# Patient Record
Sex: FEMALE | Race: ASIAN | NOT HISPANIC OR LATINO | URBAN - METROPOLITAN AREA
[De-identification: names, ages, dates, MRNs, and addresses within clinical notes are randomized per-mention and may not be internally consistent; named-entity substitution may affect disease eponyms.]

---

## 2022-08-12 ENCOUNTER — EMERGENCY (EMERGENCY)
Facility: HOSPITAL | Age: 77
LOS: 1 days | Discharge: HOME | End: 2022-08-12
Attending: EMERGENCY MEDICINE
Payer: MEDICAID

## 2022-08-12 VITALS
HEIGHT: 62 IN | HEART RATE: 82 BPM | WEIGHT: 139.99 LBS | DIASTOLIC BLOOD PRESSURE: 79 MMHG | SYSTOLIC BLOOD PRESSURE: 154 MMHG | OXYGEN SATURATION: 97 % | RESPIRATION RATE: 19 BRPM | TEMPERATURE: 98 F

## 2022-08-12 VITALS
DIASTOLIC BLOOD PRESSURE: 62 MMHG | OXYGEN SATURATION: 96 % | HEART RATE: 80 BPM | TEMPERATURE: 97 F | SYSTOLIC BLOOD PRESSURE: 154 MMHG | RESPIRATION RATE: 18 BRPM

## 2022-08-12 PROCEDURE — 72131 CT LUMBAR SPINE W/O DYE: CPT | Mod: 26,ME

## 2022-08-12 PROCEDURE — G1004: CPT

## 2022-08-12 PROCEDURE — 99284 EMERGENCY DEPT VISIT MOD MDM: CPT

## 2022-08-12 PROCEDURE — 72131 CT LUMBAR SPINE W/O DYE: CPT | Mod: ME

## 2022-08-12 PROCEDURE — 99284 EMERGENCY DEPT VISIT MOD MDM: CPT | Mod: 25

## 2022-08-12 RX ORDER — ACETAMINOPHEN 500 MG
975 TABLET ORAL ONCE
Refills: 0 | Status: DISCONTINUED | OUTPATIENT
Start: 2022-08-12 | End: 2022-08-12

## 2022-08-12 RX ORDER — LIDOCAINE 4 G/100G
1 CREAM TOPICAL ONCE
Refills: 0 | Status: COMPLETED | OUTPATIENT
Start: 2022-08-12 | End: 2022-08-12

## 2022-08-12 RX ADMIN — Medication 500 MILLIGRAM(S): at 19:56

## 2022-08-12 RX ADMIN — LIDOCAINE 1 PATCH: 4 CREAM TOPICAL at 19:56

## 2022-08-12 NOTE — ED PROVIDER NOTE - PROGRESS NOTE DETAILS
Anthony PGY2: CT shows no cord compression fx. patient and family (daughter Zachery) updated of imaging finding. Patient's last BM was this morning. Denies loss of control. Patient passed road test in the Emergency Room. Patient will follow up with Spine in the clinic. Anthony PGY2: CT shows no cord compression  or fx. patient and family (daughter Zachery) updated of imaging finding. Patient's last BM was this morning. Denies loss of control. Patient passed road test in the Emergency Room, was able to ambulate unassisted w smooth steady gait. Patient will follow up with Spine in the clinic.

## 2022-08-12 NOTE — ED ADULT NURSE NOTE - OBJECTIVE STATEMENT
77 yo female with HTN, T2DM presenting with low back pain for about a month, constant, rates it as 9/10. Lately, she has also been walking less as her legs would give out.  Pt denies any trauma.

## 2022-08-12 NOTE — ED PROVIDER NOTE - NS ED ROS FT
Constitutional:  (-) fever, (-) chills, (-) lethargy  Eyes:  (-) eye pain (-) visual changes  ENMT: (-) nasal discharge, (-) sore throat. (-) neck pain or stiffness  Cardiac: (-) chest pain (-) palpitations  Respiratory:  (-) cough (-) respiratory distress.   GI:  (-) nausea (-) vomiting (-) diarrhea (-) abdominal pain.  :  (-) dysuria (-) frequency (-) burning.  MS:  (+) back pain (-) joint pain.  Neuro:  (-) headache (+) numbness (-) tingling (-) focal weakness  Skin:  (-) rash (-) petichiae   Except as documented in the HPI,  all other systems are negative

## 2022-08-12 NOTE — ED ADULT NURSE NOTE - NSIMPLEMENTINTERV_GEN_ALL_ED
Implemented All Fall Risk Interventions:  [place] to call system. Call bell, personal items and telephone within reach. Instruct patient to call for assistance. Room bathroom lighting operational. Non-slip footwear when patient is off stretcher. Physically safe environment: no spills, clutter or unnecessary equipment. Stretcher in lowest position, wheels locked, appropriate side rails in place. Provide visual cue, wrist band, yellow gown, etc. Monitor gait and stability. Monitor for mental status changes and reorient to person, place, and time. Review medications for side effects contributing to fall risk. Reinforce activity limits and safety measures with patient and family.

## 2022-08-12 NOTE — ED PROVIDER NOTE - CLINICAL SUMMARY MEDICAL DECISION MAKING FREE TEXT BOX
75 yo female with HTN, T2DM presenting with 2 weeks of bilateral leg numbness (L>R), weakness, and difficulty ambulating. Also with 1 month of lower back pain. Exam remarkable for midline lumbar spine tenderness and diminished sensation of lower extremity L>R. Given progressive neuro symptoms, will obtain CT L spine to r/o cord compression. Treat pain with lidocaine patch and naproxen. 75 yo female with HTN, T2DM presenting with 2 weeks of bilateral leg numbness (L>R), also with 1 month of lower back pain. No fall or trauma, no weight loss or night sweats. no saddle anesthesia, no bowel or bladder dysf. Pt is ambulatory in ED with no motor weakness on exam. Pt does report dec sensation to the BL LE L>R. As well as midline lower lumbar spine tenderness. Given age and LE sensory findings will obtain CT L spine to eval for fracture, spinal cord compression. Analgesia, Re-eval. Pending imaging, suspect will be able to DC and FU w SPINE as an OP.

## 2022-08-12 NOTE — ED PROVIDER NOTE - PHYSICAL EXAMINATION
PHYSICAL EXAM:  GENERAL: NAD, lying in bed comfortably  HEENT: NC/AT, EOMI, PERRL  NECK: Supple, No JVD  CHEST/LUNG: CTAB, no increased WOB  HEART: RRR, no m/r/g  ABDOMEN: soft, NT, ND, BS+  EXTREMITIES:  2+ peripheral pulses, no clubbing, no edema  NERVOUS SYSTEM:  A&Ox3, diminished LE sensation (L>R), intact strength upper and lower extremity   BACK: No vertebral stepoff, paraspinal tenderness and tenderness of lumbar spine  SKIN: No rashes or lesions PHYSICAL EXAM:  GENERAL: NAD, lying in bed comfortably with knees and hips flexed and knees pulled up to chest  HEENT: NC/AT, EOMI, PERRL  NECK: Supple, No JVD  CHEST/LUNG: CTAB, no increased WOB  HEART: RRR, no m/r/g  ABDOMEN: soft, NT, ND, BS+  EXTREMITIES:  2+ peripheral pulses, no clubbing, no edema  NERVOUS SYSTEM:  A&Ox3, diminished LE sensation (L>R), 5/5 strength BL upper and lower extremity, no saddle anesthesias   BACK: No vertebral stepoff, paraspinal tenderness, + midline tenderness of low lumbar spine and LS junction  SKIN: No rashes or lesions

## 2022-08-12 NOTE — ED PROVIDER NOTE - CARE PLAN
1 Principal Discharge DX:	Back pain   Principal Discharge DX:	Back pain  Secondary Diagnosis:	Leg numbness

## 2022-08-12 NOTE — ED PROVIDER NOTE - PATIENT PORTAL LINK FT
You can access the FollowMyHealth Patient Portal offered by [facility] by registering at the following website: [email or web address]. By joining [facility]’s FollowMyHealth portal, you will also be able to view your health information using other applications (apps) compatible with our system.

## 2022-08-12 NOTE — ED PROVIDER NOTE - OBJECTIVE STATEMENT
75 yo female with HTN, T2DM presenting with 2 weeks of bilateral leg numbness (L>R), weakness, and difficulty ambulating. She has had low back pain for about a month, constant, rates it as 9/10. Per family, she often cries at home due to back pain. Lately, she has also been walking less as her legs would give out. No bowel/bladder incontinence. Denies any trauma, falls, IVDU. She moved from [place] a month ago, have not seen a doctor about this.   She denied fever, chills, SOB, cough, chest pain, nausea, vomiting, abdominal pain, urinary symptoms. 75 yo female with HTN, T2DM presenting with 2 weeks of bilateral leg numbness (L>R), weakness, and difficulty ambulating. She has had low back pain for about a month, constant, rates it as 9/10. Per family, she often cries at home due to back pain. Lately, she has also been walking less as her legs would give out. No bowel/bladder incontinence. Denies any trauma, falls, IVDU. She moved from [place] a month ago, have not seen a doctor about this. She denied fever, chills, SOB, cough, chest pain, nausea, vomiting, abdominal pain, urinary symptoms. 77 yo female with HTN, T2DM presenting with 2 weeks of bilateral leg numbness (L>R), weakness, and difficulty ambulating. She has had low back pain for about a month, constant, rates it as 9/10. Lately, she has also been walking less as her legs would give out. No bowel/bladder incontinence. Denies any trauma, falls, IVDU. She moved from [place] a month ago, have not seen a doctor about this. She denied fever, chills, SOB, cough, chest pain, nausea, vomiting, abdominal pain, urinary symptoms.

## 2022-08-12 NOTE — ED PROVIDER NOTE - NSFOLLOWUPINSTRUCTIONS_ED_ALL_ED_FT
You may take Tylenol 650 mg every 6 hours (no more than 4000 mg per 24 hours) as needed for pain.   You may take Ibuprofen 400 mg every 6 hours as needed for pain.     Please follow up with your Spine doctor within the next 5-7 days to monitor your symptoms.     Please come back to the Emergency Room if your symptoms get worse or if you are not able to control your bowels/urine, weakness of legs, fever.

## 2022-08-15 PROBLEM — Z00.00 ENCOUNTER FOR PREVENTIVE HEALTH EXAMINATION: Status: ACTIVE | Noted: 2022-08-15

## 2022-08-15 PROBLEM — I10 ESSENTIAL (PRIMARY) HYPERTENSION: Chronic | Status: ACTIVE | Noted: 2022-08-12

## 2022-08-15 PROBLEM — E11.9 TYPE 2 DIABETES MELLITUS WITHOUT COMPLICATIONS: Chronic | Status: ACTIVE | Noted: 2022-08-12

## 2022-08-31 ENCOUNTER — APPOINTMENT (OUTPATIENT)
Dept: ORTHOPEDIC SURGERY | Facility: HOSPITAL | Age: 77
End: 2022-08-31

## 2022-09-21 ENCOUNTER — APPOINTMENT (OUTPATIENT)
Dept: ORTHOPEDIC SURGERY | Facility: HOSPITAL | Age: 77
End: 2022-09-21